# Patient Record
Sex: MALE | Race: WHITE | NOT HISPANIC OR LATINO | Employment: OTHER | URBAN - METROPOLITAN AREA
[De-identification: names, ages, dates, MRNs, and addresses within clinical notes are randomized per-mention and may not be internally consistent; named-entity substitution may affect disease eponyms.]

---

## 2021-08-15 PROBLEM — S12.9XXA CLOSED FRACTURE OF CERVICAL VERTEBRA: Status: ACTIVE | Noted: 2021-08-15

## 2021-08-15 PROBLEM — Z87.81 HISTORY OF COMPRESSION FRACTURE OF SPINE: Status: ACTIVE | Noted: 2021-08-15

## 2021-08-16 PROBLEM — G89.29 CHRONIC NECK PAIN: Status: ACTIVE | Noted: 2021-08-16

## 2021-08-16 PROBLEM — G31.84 MILD COGNITIVE IMPAIRMENT WITH MEMORY LOSS: Status: ACTIVE | Noted: 2021-08-16

## 2021-08-16 PROBLEM — M54.2 CHRONIC NECK PAIN: Status: ACTIVE | Noted: 2021-08-16

## 2023-12-30 PROBLEM — E78.5 HLD (HYPERLIPIDEMIA): Status: ACTIVE | Noted: 2023-12-30

## 2023-12-30 PROBLEM — F17.200 SMOKING: Status: ACTIVE | Noted: 2023-12-30

## 2023-12-30 PROBLEM — R41.89 COGNITIVE DECLINE: Status: ACTIVE | Noted: 2023-12-30

## 2023-12-30 PROBLEM — I10 HTN (HYPERTENSION): Status: ACTIVE | Noted: 2023-12-30

## 2023-12-30 PROBLEM — N40.0 BPH (BENIGN PROSTATIC HYPERPLASIA): Status: ACTIVE | Noted: 2023-12-30

## 2024-01-03 ENCOUNTER — PATIENT OUTREACH (OUTPATIENT)
Dept: ADMINISTRATIVE | Facility: CLINIC | Age: 69
End: 2024-01-03

## 2024-01-03 ENCOUNTER — PATIENT MESSAGE (OUTPATIENT)
Dept: ADMINISTRATIVE | Facility: CLINIC | Age: 69
End: 2024-01-03

## 2024-01-03 NOTE — PROGRESS NOTES
C3 nurse attempted to contact Morenobarbara Gómez for a TCC post hospital discharge follow up call. No answer. Left voicemail with callback information. The patient does not have a scheduled HOSFU appointment.No PCP on chart

## 2024-01-05 DIAGNOSIS — G89.29 CHRONIC NECK PAIN: Primary | ICD-10-CM

## 2024-01-05 DIAGNOSIS — M54.2 CHRONIC NECK PAIN: Primary | ICD-10-CM
